# Patient Record
Sex: FEMALE | Race: BLACK OR AFRICAN AMERICAN | URBAN - METROPOLITAN AREA
[De-identification: names, ages, dates, MRNs, and addresses within clinical notes are randomized per-mention and may not be internally consistent; named-entity substitution may affect disease eponyms.]

---

## 2024-09-04 ENCOUNTER — APPOINTMENT (OUTPATIENT)
Dept: NEUROLOGY | Facility: CLINIC | Age: 24
End: 2024-09-04
Payer: COMMERCIAL

## 2024-09-04 NOTE — PROGRESS NOTES
Adult Epilepsy Clinic History and Physical    History Of Present Illness  Sangita Westbrook is a 24 y.o. female ***-handed presenting with ***. She was previously seen at [facility] for paroxysmal nocturnal events concerning for seizures.    Anamnesis (per patient): ***    Anamnesis (per witness): ***    Epilepsy Risk Factors:   History of head trauma: ***  History of Febrile seizures: ***  Family history of seizures/epilepsy: ***  History of CNS infection: ***  History of other CNS injury: ***  Birth/Developmental history: ***    Epilepsy Co-morbidites:  Depression: ***  Anxiety: ***  Memory loss: ***  Sleep disorders: ***    4D-Classification:  Event type: ***  Seizure Semiology: ***  Frequency: ***  Lateralizing signs: ***  Diagnostic signs: ***  Epileptogenic zone: ***  Etiology: ***  Co-morbidities: ***    Triggers: ***  Onset of events: ***  Current ASMs: ***  Previous ASMs: ***    History of status epilepticus: ***    Social History:   ETOH: ***  Smoking: ***  Illicit drug use: ***  Occupation: ***  Education level: ***  Driving: ***    Past Medical History  No past medical history on file.  Surgical History  No past surgical history on file.  Social History     Allergies  Patient has no allergy information on record.  (Not in a hospital admission)    Medications  No current outpatient medications        Review of Systems  GENERAL APPEARANCE:  No distress, alert and cooperative.   MENTAL STATE:  Orientation was normal to time, place and person. Recent and remote memory was intact.  Attention span and concentration were normal. Language testing was normal for comprehension, repetition, expression, and naming. General fund of knowledge was intact.   CRANIAL NERVES:     CN 2- Visual Acuity was grossly normal. Visual fields full to confrontation by finger counting.      CN 3, 4, 6-  Pupils round, *** mm in diameter. No ptosis. EOMs normal alignment, full range of movement, no nystagmus     CN 5- Facial  sensation intact bilaterally.      CN 7- Normal and symmetric facial strength. Nasolabial folds symmetric.     CN 8- Hearing intact      CN 9- Palate elevates symmetrically.      CN 11- Normal strength of shoulder shrug and neck turning      CN 12- Tongue midline, with normal bulk and strength; no fasciculations.   MOTOR:  Motor exam was normal. Muscle bulk and tone were normal in both upper and lower extremities. Muscle strength was 5/5 in distal and proximal muscles in both upper and lower extremities. No fasciculations, tremor or other abnormal movements were present.   REFLEXES:  Right/Left:  Biceps 2/2, brachioradialis 2/2, patellar 2/2, achilles 2/2   SENSORY: Sensory exam was intact to light touch, temperature, and vibration in both UE and LE.   COORDINATION: Finger tapping and open-close hand maneuvers were smooth and without interruptions.  Finger-nose-finger was intact without dysmetria or overshoot.    GAIT: Station was stable with a normal base and negative Romberg sign. Gait was stable with a normal arm swing and speed. No ataxia, shuffling, steppage or waddling was noted. Tandem gait was intact  Last Recorded Vitals  There were no vitals taken for this visit.    Relevant Results/Neurodiagnostics  No MRI head results found for the past 12 months    [facility] 75 minute EEG (4/7/23): This EEG is suggestive of a potential epileptogenicity in the right frontotemporal region.  No EEG seizures were seen during this recording.     F MRI Brain wo (4/7/23): No evidence of intracranial acute signal abnormality or developmental malformation.   Probable pituitary microadenoma which may be assessed on dedicated MRI pituitary protocol without/with contrast as clinically warranted.      There are no imaging or EEG results to personally review.    Assessment/Plan  4D-Classification:  Event type: ***  Seizure Semiology: ***  Frequency: ***  Lateralizing signs: ***  Diagnostic signs: ***  Epileptogenic zone: ***  Etiology:  ***  Co-morbidities: ***    Onset of events: ***  Current ASMs: ***  Previous ASMs: ***    Sangita Westbrook is a 24 y.o. female who presents ***    Plan:  ***        Dianne Disla MD  [facility], [facility]